# Patient Record
Sex: FEMALE | Race: OTHER | NOT HISPANIC OR LATINO
[De-identification: names, ages, dates, MRNs, and addresses within clinical notes are randomized per-mention and may not be internally consistent; named-entity substitution may affect disease eponyms.]

---

## 2019-02-20 ENCOUNTER — RESULT REVIEW (OUTPATIENT)
Age: 24
End: 2019-02-20

## 2020-02-17 ENCOUNTER — RESULT REVIEW (OUTPATIENT)
Age: 25
End: 2020-02-17

## 2021-03-16 ENCOUNTER — TRANSCRIPTION ENCOUNTER (OUTPATIENT)
Age: 26
End: 2021-03-16

## 2021-10-22 ENCOUNTER — INPATIENT (INPATIENT)
Facility: HOSPITAL | Age: 26
LOS: 0 days | Discharge: HOME | End: 2021-10-23
Attending: OBSTETRICS & GYNECOLOGY | Admitting: OBSTETRICS & GYNECOLOGY
Payer: COMMERCIAL

## 2021-10-22 ENCOUNTER — RESULT REVIEW (OUTPATIENT)
Age: 26
End: 2021-10-22

## 2021-10-22 VITALS
TEMPERATURE: 98 F | HEIGHT: 67 IN | SYSTOLIC BLOOD PRESSURE: 128 MMHG | DIASTOLIC BLOOD PRESSURE: 64 MMHG | OXYGEN SATURATION: 100 % | RESPIRATION RATE: 18 BRPM | WEIGHT: 158.95 LBS | HEART RATE: 94 BPM

## 2021-10-22 LAB
ALBUMIN SERPL ELPH-MCNC: 4.4 G/DL — SIGNIFICANT CHANGE UP (ref 3.5–5.2)
ALP SERPL-CCNC: 69 U/L — SIGNIFICANT CHANGE UP (ref 30–115)
ALT FLD-CCNC: 6 U/L — SIGNIFICANT CHANGE UP (ref 0–41)
ANION GAP SERPL CALC-SCNC: 16 MMOL/L — HIGH (ref 7–14)
APPEARANCE UR: CLEAR — SIGNIFICANT CHANGE UP
AST SERPL-CCNC: 12 U/L — SIGNIFICANT CHANGE UP (ref 0–41)
BACTERIA # UR AUTO: NEGATIVE — SIGNIFICANT CHANGE UP
BASOPHILS # BLD AUTO: 0.06 K/UL — SIGNIFICANT CHANGE UP (ref 0–0.2)
BASOPHILS NFR BLD AUTO: 0.8 % — SIGNIFICANT CHANGE UP (ref 0–1)
BILIRUB SERPL-MCNC: 0.3 MG/DL — SIGNIFICANT CHANGE UP (ref 0.2–1.2)
BILIRUB UR-MCNC: ABNORMAL
BUN SERPL-MCNC: 19 MG/DL — SIGNIFICANT CHANGE UP (ref 10–20)
CALCIUM SERPL-MCNC: 9.5 MG/DL — SIGNIFICANT CHANGE UP (ref 8.5–10.1)
CHLORIDE SERPL-SCNC: 102 MMOL/L — SIGNIFICANT CHANGE UP (ref 98–110)
CO2 SERPL-SCNC: 21 MMOL/L — SIGNIFICANT CHANGE UP (ref 17–32)
COLOR SPEC: YELLOW — SIGNIFICANT CHANGE UP
CREAT SERPL-MCNC: 0.7 MG/DL — SIGNIFICANT CHANGE UP (ref 0.7–1.5)
DIFF PNL FLD: NEGATIVE — SIGNIFICANT CHANGE UP
EOSINOPHIL # BLD AUTO: 0.11 K/UL — SIGNIFICANT CHANGE UP (ref 0–0.7)
EOSINOPHIL NFR BLD AUTO: 1.4 % — SIGNIFICANT CHANGE UP (ref 0–8)
EPI CELLS # UR: 7 /HPF — HIGH (ref 0–5)
GLUCOSE SERPL-MCNC: 74 MG/DL — SIGNIFICANT CHANGE UP (ref 70–99)
GLUCOSE UR QL: NEGATIVE — SIGNIFICANT CHANGE UP
HCT VFR BLD CALC: 37.4 % — SIGNIFICANT CHANGE UP (ref 37–47)
HGB BLD-MCNC: 11.9 G/DL — LOW (ref 12–16)
HYALINE CASTS # UR AUTO: 9 /LPF — HIGH (ref 0–7)
IMM GRANULOCYTES NFR BLD AUTO: 0.3 % — SIGNIFICANT CHANGE UP (ref 0.1–0.3)
KETONES UR-MCNC: ABNORMAL
LEUKOCYTE ESTERASE UR-ACNC: NEGATIVE — SIGNIFICANT CHANGE UP
LIDOCAIN IGE QN: 50 U/L — SIGNIFICANT CHANGE UP (ref 7–60)
LYMPHOCYTES # BLD AUTO: 1.93 K/UL — SIGNIFICANT CHANGE UP (ref 1.2–3.4)
LYMPHOCYTES # BLD AUTO: 24.9 % — SIGNIFICANT CHANGE UP (ref 20.5–51.1)
MCHC RBC-ENTMCNC: 25.4 PG — LOW (ref 27–31)
MCHC RBC-ENTMCNC: 31.8 G/DL — LOW (ref 32–37)
MCV RBC AUTO: 79.9 FL — LOW (ref 81–99)
MONOCYTES # BLD AUTO: 0.47 K/UL — SIGNIFICANT CHANGE UP (ref 0.1–0.6)
MONOCYTES NFR BLD AUTO: 6.1 % — SIGNIFICANT CHANGE UP (ref 1.7–9.3)
NEUTROPHILS # BLD AUTO: 5.17 K/UL — SIGNIFICANT CHANGE UP (ref 1.4–6.5)
NEUTROPHILS NFR BLD AUTO: 66.5 % — SIGNIFICANT CHANGE UP (ref 42.2–75.2)
NITRITE UR-MCNC: NEGATIVE — SIGNIFICANT CHANGE UP
NRBC # BLD: 0 /100 WBCS — SIGNIFICANT CHANGE UP (ref 0–0)
PH UR: 6 — SIGNIFICANT CHANGE UP (ref 5–8)
PLATELET # BLD AUTO: 338 K/UL — SIGNIFICANT CHANGE UP (ref 130–400)
POTASSIUM SERPL-MCNC: 4.6 MMOL/L — SIGNIFICANT CHANGE UP (ref 3.5–5)
POTASSIUM SERPL-SCNC: 4.6 MMOL/L — SIGNIFICANT CHANGE UP (ref 3.5–5)
PROT SERPL-MCNC: 7.5 G/DL — SIGNIFICANT CHANGE UP (ref 6–8)
PROT UR-MCNC: ABNORMAL
RBC # BLD: 4.68 M/UL — SIGNIFICANT CHANGE UP (ref 4.2–5.4)
RBC # FLD: 12.1 % — SIGNIFICANT CHANGE UP (ref 11.5–14.5)
RBC CASTS # UR COMP ASSIST: 7 /HPF — HIGH (ref 0–4)
SARS-COV-2 RNA SPEC QL NAA+PROBE: SIGNIFICANT CHANGE UP
SODIUM SERPL-SCNC: 139 MMOL/L — SIGNIFICANT CHANGE UP (ref 135–146)
SP GR SPEC: >1.05 (ref 1.01–1.03)
UROBILINOGEN FLD QL: ABNORMAL
WBC # BLD: 7.76 K/UL — SIGNIFICANT CHANGE UP (ref 4.8–10.8)
WBC # FLD AUTO: 7.76 K/UL — SIGNIFICANT CHANGE UP (ref 4.8–10.8)
WBC UR QL: 6 /HPF — HIGH (ref 0–5)

## 2021-10-22 PROCEDURE — 99285 EMERGENCY DEPT VISIT HI MDM: CPT

## 2021-10-22 PROCEDURE — 76830 TRANSVAGINAL US NON-OB: CPT | Mod: 26

## 2021-10-22 PROCEDURE — 88305 TISSUE EXAM BY PATHOLOGIST: CPT | Mod: 26

## 2021-10-22 PROCEDURE — 99283 EMERGENCY DEPT VISIT LOW MDM: CPT | Mod: 57

## 2021-10-22 PROCEDURE — 58662 LAPAROSCOPY EXCISE LESIONS: CPT

## 2021-10-22 RX ORDER — SODIUM CHLORIDE 9 MG/ML
1000 INJECTION, SOLUTION INTRAVENOUS ONCE
Refills: 0 | Status: COMPLETED | OUTPATIENT
Start: 2021-10-22 | End: 2021-10-22

## 2021-10-22 RX ORDER — MORPHINE SULFATE 50 MG/1
2 CAPSULE, EXTENDED RELEASE ORAL
Refills: 0 | Status: DISCONTINUED | OUTPATIENT
Start: 2021-10-22 | End: 2021-10-23

## 2021-10-22 RX ORDER — IBUPROFEN 200 MG
1 TABLET ORAL
Qty: 28 | Refills: 0
Start: 2021-10-22 | End: 2021-10-28

## 2021-10-22 RX ORDER — ONDANSETRON 8 MG/1
4 TABLET, FILM COATED ORAL ONCE
Refills: 0 | Status: DISCONTINUED | OUTPATIENT
Start: 2021-10-22 | End: 2021-10-23

## 2021-10-22 RX ORDER — ACETAMINOPHEN 500 MG
1 TABLET ORAL
Qty: 28 | Refills: 0
Start: 2021-10-22 | End: 2021-10-28

## 2021-10-22 RX ORDER — OXYCODONE AND ACETAMINOPHEN 5; 325 MG/1; MG/1
1 TABLET ORAL ONCE
Refills: 0 | Status: DISCONTINUED | OUTPATIENT
Start: 2021-10-22 | End: 2021-10-23

## 2021-10-22 RX ORDER — SODIUM CHLORIDE 9 MG/ML
1000 INJECTION, SOLUTION INTRAVENOUS
Refills: 0 | Status: DISCONTINUED | OUTPATIENT
Start: 2021-10-22 | End: 2021-10-23

## 2021-10-22 RX ORDER — MEPERIDINE HYDROCHLORIDE 50 MG/ML
12.5 INJECTION INTRAMUSCULAR; INTRAVENOUS; SUBCUTANEOUS ONCE
Refills: 0 | Status: DISCONTINUED | OUTPATIENT
Start: 2021-10-22 | End: 2021-10-22

## 2021-10-22 RX ORDER — KETOROLAC TROMETHAMINE 30 MG/ML
30 SYRINGE (ML) INJECTION ONCE
Refills: 0 | Status: DISCONTINUED | OUTPATIENT
Start: 2021-10-22 | End: 2021-10-22

## 2021-10-22 RX ADMIN — MEPERIDINE HYDROCHLORIDE 12.5 MILLIGRAM(S): 50 INJECTION INTRAMUSCULAR; INTRAVENOUS; SUBCUTANEOUS at 23:30

## 2021-10-22 RX ADMIN — SODIUM CHLORIDE 1000 MILLILITER(S): 9 INJECTION, SOLUTION INTRAVENOUS at 13:01

## 2021-10-22 RX ADMIN — Medication 30 MILLIGRAM(S): at 22:45

## 2021-10-22 RX ADMIN — MEPERIDINE HYDROCHLORIDE 12.5 MILLIGRAM(S): 50 INJECTION INTRAMUSCULAR; INTRAVENOUS; SUBCUTANEOUS at 23:15

## 2021-10-22 RX ADMIN — Medication 30 MILLIGRAM(S): at 23:00

## 2021-10-22 NOTE — CONSULT NOTE ADULT - SUBJECTIVE AND OBJECTIVE BOX
Chief Complaint: lower abdominal pain    HPI: 24 y/o G0, LMP 10/12, with no significant pmhx, presents to the ED with sharp lower abdominal pain for the past 2 days, L>R. Pain started on Wednesday night, 10/10 in intensity, patient went [place] ED, had a TVUS: 5.9cm complex cyst with homogenous internal echoes, favors endometrioma, increased from 2.1cm from prior exam. She was given morphine with relief of pain and was discharged home. Currently reports dull achy pain, 4/10 in intensity with sudden sharp shooting pain. Denies fever, chills, SOB, chest pain, vaginal discharge, h/o STD's or vaginal bleeding.    Ob/Gyn History:  G0P                 LMP - 10/12                  Cycle Length -  regular  Denies history of uterine fibroids, abnormal paps, or STIs    Denies the following: constitutional symptoms, visual symptoms, cardiovascular symptoms, respiratory symptoms, GI symptoms, musculoskeletal symptoms, skin symptoms, neurologic symptoms, hematologic symptoms, allergic symptoms, psychiatric symptoms  Except any pertinent positives listed.     Home Medications:      Allergies  Allergy Status Unknown    PAST MEDICAL & SURGICAL HISTORY:  none    FAMILY HISTORY:  none    SOCIAL HISTORY: Denies cigarette use, alcohol use, or illicit drug use    Vital Signs Last 24 Hrs  T(F): 98.3 (22 Oct 2021 12:08), Max: 98.3 (22 Oct 2021 12:08)  HR: 94 (22 Oct 2021 12:08) (94 - 94)  BP: 128/64 (22 Oct 2021 12:08) (128/64 - 128/64)  RR: 18 (22 Oct 2021 12:08) (18 - 18)    General Appearance - AAOx3, NAD  Heart - S1S2 regular rate and rhythm  Lung - CTA Bilaterally  Abdomen - Soft, mildly tenderness to palpation of LLQ, no rebound, no rigidity, no guarding, bowel sounds present, mildly distended    GYN/Pelvis:    Vagina - Normal  Cervix - No CMT    Uterus:  Size - Normal  Tenderness - None  Mass - None  Freely mobile    Adnexa:  Masses - None  Tenderness - None      LABS:                        11.9   7.76  )-----------( 338      ( 22 Oct 2021 12:18 )             37.4         10-22    139  |  102  |  19  ----------------------------<  74  4.6   |  21  |  0.7    Ca    9.5      22 Oct 2021 12:18    TPro  7.5  /  Alb  4.4  /  TBili  0.3  /  DBili  x   /  AST  12  /  ALT  6   /  AlkPhos  69  10-22      Urinalysis Basic - ( 22 Oct 2021 12:18 )    Color: Yellow / Appearance: Clear / SG: >1.050 / pH: x  Gluc: x / Ketone: Large  / Bili: Small / Urobili: 3 mg/dL   Blood: x / Protein: 100 mg/dL / Nitrite: Negative   Leuk Esterase: Negative / RBC: 7 /HPF / WBC 6 /HPF   Sq Epi: x / Non Sq Epi: 7 /HPF / Bacteria: Negative          RADIOLOGY & ADDITIONAL STUDIES:

## 2021-10-22 NOTE — H&P ADULT - ATTENDING COMMENTS
Patient seen and examined. Labs reviewed. Imaging reviewed. I also discussed case with patient's mother and father who are present. Patient with LLQ and likely left sided endometrioma that may be leaking and causing pain. I doubt there is torsion. Pain has been waxing and waning. This is the second time she is in the emergency room. Options of continued observation vs surgical removal of cyst discussed. She agrees for surgery - laparoscopic ovarian cystectomy. Risks of adjacent organ injury, conversion to open, and the need to remove the ovary were discussed. Consent signed.

## 2021-10-22 NOTE — H&P ADULT - ASSESSMENT
26 y/o G0, with left ovarian cyst, can not rule out torsion, currently clinically and hemodynamically  - admit to GYN  - Counseled patient on medical vs surgical management, patient voiced an understanding and decided to proceed to diagnostic laparoscopy with ovarian cystectomy  - NPO  - f/u covid swab  - IVF hydration  - on call to OR    Dr. Duval and Dr. Carney aware

## 2021-10-22 NOTE — ED PROVIDER NOTE - CLINICAL SUMMARY MEDICAL DECISION MAKING FREE TEXT BOX
ED work up reviewed and OB/GYN consult appreciated. Cyst is smaller than previously and may be a hemorrhagic cyst (vs endometrioma). Patient clinically not in severe pain and I do not suspect ovarian torsion at this time. Ob/GYN team to admit for possible laparoscopic surgery.

## 2021-10-22 NOTE — H&P ADULT - HISTORY OF PRESENT ILLNESS
HPI: 24 y/o G0, LMP 10/12, with no significant pmhx, presents to the ED with sharp lower abdominal pain for the past 2 days, L>R. Pain started on Wednesday night, 10/10 in intensity, patient went [place] ED, had a TVUS: 5.9cm complex cyst with homogenous internal echoes, favors endometrioma, increased from 2.1cm from prior exam. She was given morphine with relief of pain and was discharged home. Currently reports dull achy pain, 4/10 in intensity with sudden sharp shooting pain. Denies fever, chills, SOB, chest pain, vaginal discharge, h/o STD's or vaginal bleeding.    Ob/Gyn History:  G0P                 LMP - 10/12                  Cycle Length -  regular  Denies history of uterine fibroids, abnormal paps, or STIs

## 2021-10-22 NOTE — H&P ADULT - NSHPLABSRESULTS_GEN_ALL_CORE
11.9   7.76  )-----------( 338      ( 22 Oct 2021 12:18 )             37.4     10-22    139  |  102  |  19  ----------------------------<  74  4.6   |  21  |  0.7    Ca    9.5      22 Oct 2021 12:18    TPro  7.5  /  Alb  4.4  /  TBili  0.3  /  DBili  x   /  AST  12  /  ALT  6   /  AlkPhos  69  10-22    Sonograms:  < from: US Transvaginal (10.22.21 @ 15:24) >    EXAM:  US TRANSVAGINAL            PROCEDURE DATE:  10/22/2021            INTERPRETATION:  CLINICAL INFORMATION: Worsening pain. Known left ovarian cyst.    LMP: 10/12/2021    COMPARISON: None available.    TECHNIQUE:  Endovaginal and transabdominal pelvic sonogram. Color and Spectral Doppler was performed.    FINDINGS:    Uterus: 6.2 cm x 2.9 cm x 3.6 cm. Within normal limits.  Endometrium: 5 mm. Within normal limits.    Right ovary: 3.4 cm x 1.8 cm x 1.5 cm. Within normal limits. Normal arterial waveform in the right ovary.  Left ovary: 5.3 cm x 5.1 cm x 5.3 cm, normal in echotexture. Homogeneously hypoechoic mass in the left ovary measures 4.5 x 4.4 x 4.7 cm. Associated posterior acoustic enhancement and no internal flow identified. Findings most consistent with a hemorrhagic cyst. Normal arterial waveform in the left ovary.    Fluid: None.    IMPRESSION:    Findings most consistent with a 4.7 cm left ovarian hemorrhagic cyst. Follow-up ultrasound may be obtained in 8 weeks to demonstrate evolution.    < end of copied text >

## 2021-10-22 NOTE — ASU DISCHARGE PLAN (ADULT/PEDIATRIC) - CARE PROVIDER_API CALL
Parminder Carney)  Obstetrics and Gynecology  [address] [place] [address]  Phone: [phone]  Fax: [phone]  Follow Up Time: 2 weeks

## 2021-10-22 NOTE — H&P ADULT - NSHPPHYSICALEXAM_GEN_ALL_CORE
Vital Signs Last 24 Hrs  T(C): 36.8 (22 Oct 2021 12:08), Max: 36.8 (22 Oct 2021 12:08)  T(F): 98.3 (22 Oct 2021 12:08), Max: 98.3 (22 Oct 2021 12:08)  HR: 94 (22 Oct 2021 12:08) (94 - 94)  BP: 128/64 (22 Oct 2021 12:08) (128/64 - 128/64)  RR: 18 (22 Oct 2021 12:08) (18 - 18)  SpO2: 100% (22 Oct 2021 12:08) (100% - 100%)    General Appearance - AAOx3, NAD  Heart - S1S2 regular rate and rhythm  Lung - CTA Bilaterally  Abdomen - Soft, mildly tenderness to palpation of LLQ, no rebound, no rigidity, no guarding, bowel sounds present, mildly distended    GYN/Pelvis:    Vagina - Normal  Cervix - No CMT    Uterus:  Size - Normal  Tenderness - None  Mass - None  Freely mobile    Adnexa:  Masses - None  Tenderness - None

## 2021-10-22 NOTE — ED PROVIDER NOTE - NS ED ROS FT
Review of Systems:  CONSTITUTIONAL - No fever  SKIN - No rash  HEMATOLOGIC - No abnormal bleeding or bruising  RESPIRATORY - No shortness of breath, No cough  CARDIAC -No chest pain, No palpitations  GI - No vomiting, No diarrhea  - No dysuria, frequency, hematuria.  All other systems negative, unless specified in HPI

## 2021-10-22 NOTE — CHART NOTE - NSCHARTNOTEFT_GEN_A_CORE
Anesthesia Post Op Assessment  		(    ) Intubated           TV _____	Rate __sv___	[id number]____  		(  x  ) Patent airway. Full return of protective reflexes  		( x   )Full recovery from anesthesia/sedation to baseline status      Cardiovascular Function:  		BP:	123/69	                  Pulse:		86                  RR:		12                  Temp:		97.6                  O2Sat:  99      Mental Status:  	        ( x   ) awake		  (  x) alert    Nausea/Vomiting:  		(    ) Yes, See post-op orders		   ( x   ) No      Pain Scale: (0-10):			Treatment:     (    ) None	            ( x   ) See Post-Op/PCA Orders      Post-operative Fluids: 	   (    ) Oral	          (  x  ) See post-op Orders        Comments:    Uneventful. No complications from anesthesia.  Discharge when criteria met.

## 2021-10-22 NOTE — BRIEF OPERATIVE NOTE - OPERATION/FINDINGS
6 week sized anteverted uterus and closed nulliparus cervix. On laparoscopy 6 cm left ovarian endometrioma, normal right ovary and tubes bilaterally. endometrial implants in the posterior cul de sac, normal appendix

## 2021-10-22 NOTE — ED PROVIDER NOTE - OBJECTIVE STATEMENT
25Y F with recently diagnosed ovarian cyst presents with LLQ pelvic pain. Patient reports that 25Y F with recently diagnosed ovarian cyst presents with LLQ pelvic pain. Patient reports that she has been having lower pelvic pain for the past few days, that has gradually gotten worse, she was evaluated at [place] and US there found to have ovarian cyst 6 cm complex. She was discharged and since then pain has worsened. Pain associated with nausea. Denies fevers, chills, chest pain, SOB, abdominal pain, N/V/D.

## 2021-10-22 NOTE — CONSULT NOTE ADULT - ASSESSMENT
26 y/o G0, with LLQ pain can not rule ovarian torsion, currently clinically and hemodynamically stable  - f/u TVUS      Dr. Duval and Dr. Carney aware

## 2021-10-22 NOTE — ED PROVIDER NOTE - ATTENDING CONTRIBUTION TO CARE
I personally evaluated patient. I agree with the findings and plan with all documentation on chart except as documented  in my note.    24 y/o F no significant PMHx who presents referred to the ED by Dr. Carney for ovarian cyst and left sided pelvis pain to be evaluated for ovarian torsion. Patient had pain for the past 2 days and was at the ED in [place] and had an Ultrasound and was found to have a 6cm cyst on left ovary. No fever or signs of infectious etiology. Patient has no urinary symptoms. She took Tylenol and Advil today with mild relief  Patient denies fever, chills, SOB, chest pain, vaginal discharge, h/o STD's or vaginal bleeding.    VS reviewed. patient non-toxic appearing and currently not in severe pain. Ob/GYN consulted and labs sent, urine being tested, and will get repeat ultrasound. Will follow up work up and reassess.

## 2021-10-23 VITALS
SYSTOLIC BLOOD PRESSURE: 108 MMHG | DIASTOLIC BLOOD PRESSURE: 61 MMHG | HEART RATE: 70 BPM | OXYGEN SATURATION: 100 % | RESPIRATION RATE: 14 BRPM

## 2021-10-24 LAB
CULTURE RESULTS: SIGNIFICANT CHANGE UP
SPECIMEN SOURCE: SIGNIFICANT CHANGE UP

## 2021-10-25 PROBLEM — Z00.00 ENCOUNTER FOR PREVENTIVE HEALTH EXAMINATION: Status: ACTIVE | Noted: 2021-10-25

## 2021-10-26 PROBLEM — Z78.9 OTHER SPECIFIED HEALTH STATUS: Chronic | Status: ACTIVE | Noted: 2021-10-22

## 2021-10-29 DIAGNOSIS — N83.512 TORSION OF LEFT OVARY AND OVARIAN PEDICLE: ICD-10-CM

## 2021-10-29 DIAGNOSIS — N83.202 UNSPECIFIED OVARIAN CYST, LEFT SIDE: ICD-10-CM

## 2021-10-31 LAB — SURGICAL PATHOLOGY STUDY: SIGNIFICANT CHANGE UP

## 2021-11-04 ENCOUNTER — APPOINTMENT (OUTPATIENT)
Dept: OBGYN | Facility: CLINIC | Age: 26
End: 2021-11-04
Payer: COMMERCIAL

## 2021-11-04 DIAGNOSIS — N80.1 ENDOMETRIOSIS OF OVARY: ICD-10-CM

## 2021-11-04 PROCEDURE — 99024 POSTOP FOLLOW-UP VISIT: CPT

## 2021-11-09 PROBLEM — N80.1 ENDOMETRIOSIS OF OVARY: Status: ACTIVE | Noted: 2021-11-09

## 2025-02-17 NOTE — ED PROVIDER NOTE - NS ED ATTENDING STATEMENT MOD
Patient needs a follow up appointment.  Arrange follow up with me for next available.   I have personally seen and examined this patient.  I have fully participated in the care of this patient. I have reviewed all pertinent clinical information, including history, physical exam, plan and the Resident’s note and agree except as noted.

## 2025-04-03 NOTE — ED PROVIDER NOTE - HISTORY ATTESTATION, MLM
I have reviewed and confirmed nurses' notes... Continue Regimen: tretinoin 0.1 % topical cream (null)\\nQuantity: 20.0 g  Days Supply: 30\\nSig: Mix a pea size amount with moisturizer, apply to the face 3 times a week at night. Render In Strict Bullet Format?: No Detail Level: Zone